# Patient Record
Sex: MALE | Race: WHITE | Employment: FULL TIME | URBAN - METROPOLITAN AREA
[De-identification: names, ages, dates, MRNs, and addresses within clinical notes are randomized per-mention and may not be internally consistent; named-entity substitution may affect disease eponyms.]

---

## 2018-02-20 ENCOUNTER — OFFICE VISIT (OUTPATIENT)
Dept: FAMILY MEDICINE CLINIC | Facility: CLINIC | Age: 33
End: 2018-02-20

## 2018-02-20 VITALS
RESPIRATION RATE: 18 BRPM | WEIGHT: 188 LBS | TEMPERATURE: 98 F | HEIGHT: 71 IN | HEART RATE: 76 BPM | BODY MASS INDEX: 26.32 KG/M2 | DIASTOLIC BLOOD PRESSURE: 74 MMHG | SYSTOLIC BLOOD PRESSURE: 112 MMHG

## 2018-02-20 DIAGNOSIS — M70.22 OLECRANON BURSITIS OF LEFT ELBOW: ICD-10-CM

## 2018-02-20 PROCEDURE — 99212 OFFICE O/P EST SF 10 MIN: CPT | Performed by: FAMILY MEDICINE

## 2018-02-20 PROCEDURE — 99202 OFFICE O/P NEW SF 15 MIN: CPT | Performed by: FAMILY MEDICINE

## 2018-02-20 NOTE — PROGRESS NOTES
HPI:    Patient ID: Peter Lebron is a 29year old male. Pt presents with swelling of the left elbow after banging his elbow on something at work about 1 month ago. Pt noticed sig swelling after about a week. Pt has had no fevers. No sig bruising.  Pt

## 2025-07-24 ENCOUNTER — HOSPITAL ENCOUNTER (OUTPATIENT)
Age: 40
Discharge: HOME | End: 2025-07-24

## 2025-07-24 ENCOUNTER — APPOINTMENT (OUTPATIENT)
Dept: CT IMAGING | Age: 40
End: 2025-07-24
Attending: NURSE PRACTITIONER

## 2025-07-24 VITALS
OXYGEN SATURATION: 97 % | DIASTOLIC BLOOD PRESSURE: 62 MMHG | TEMPERATURE: 98 F | HEART RATE: 57 BPM | SYSTOLIC BLOOD PRESSURE: 114 MMHG | RESPIRATION RATE: 20 BRPM

## 2025-07-24 DIAGNOSIS — T14.8XXA MUSCLE STRAIN: Primary | ICD-10-CM

## 2025-07-24 LAB
BILIRUB UR QL STRIP: NEGATIVE
CLARITY UR: CLEAR
COLOR UR: YELLOW
GLUCOSE UR STRIP-MCNC: NEGATIVE MG/DL
HGB UR QL STRIP: NEGATIVE
KETONES UR STRIP-MCNC: NEGATIVE MG/DL
LEUKOCYTE ESTERASE UR QL STRIP: NEGATIVE
NITRITE UR QL STRIP: NEGATIVE
PH UR STRIP: 5.5
PROT UR STRIP-MCNC: NEGATIVE MG/DL
SP GR UR STRIP: >=1.03
UROBILINOGEN UR STRIP-ACNC: <2 MG/DL

## 2025-07-24 PROCEDURE — 99204 OFFICE O/P NEW MOD 45 MIN: CPT

## 2025-07-24 PROCEDURE — 81002 URINALYSIS NONAUTO W/O SCOPE: CPT

## 2025-07-24 PROCEDURE — 74176 CT ABD & PELVIS W/O CONTRAST: CPT | Performed by: NURSE PRACTITIONER

## 2025-07-24 RX ORDER — CYCLOBENZAPRINE HCL 10 MG
10 TABLET ORAL 3 TIMES DAILY PRN
Qty: 10 TABLET | Refills: 0 | Status: SHIPPED | OUTPATIENT
Start: 2025-07-24 | End: 2025-07-31

## 2025-07-24 NOTE — DISCHARGE INSTRUCTIONS
Rest  Ice today  Heat tomorrow  Ibuprofen every 6-8 hours as needed for pain follow with your doctor do not drink or drive while taking muscle relaxer

## 2025-07-24 NOTE — ED INITIAL ASSESSMENT (HPI)
Rlq pain since this am, worse when walking , no n/v/d, no flank or back pain, no urinary symptoms, no trauma or injury

## 2025-07-24 NOTE — ED PROVIDER NOTES
Patient Seen in: [facility]        History  Chief Complaint   Patient presents with    Abdominal Pain     Stated Complaint: Right Side Pain    Subjective:   HPI     Rojas Arreola is a 39 year old male who presents with pelvic pain when walking.    He began experiencing pelvic pain this morning, initially described as a 'little ache' upon waking. The pain is localized to the right hip flank area and does not radiate to the groin. It is described as feeling like a 'pinched nerve' and occurs specifically when walking.    He played disc golf the previous night, which involves a lot of twisting, and suggests this might have contributed to his symptoms. The pain is sometimes absent, allowing him to walk about a hundred feet without issue, but then returns with a 'lightning' or 'shock' sensation when twisting.    He denies rash, shingles, kidney stones, or blood in the urine. He has a history of back pain, which he attributes to his poor quality bed. No other symptoms such as fever or urinary issues have been noticed.        Objective:     History reviewed. No pertinent past medical history.           Past Surgical History:   Procedure Laterality Date    Vasectomy  11/03/2017    Dr. Little                Social History     Socioeconomic History    Marital status:    Tobacco Use    Smoking status: Never    Smokeless tobacco: Never   Vaping Use    Vaping status: Every Day   Substance and Sexual Activity    Alcohol use: Not Currently    Drug use: Not Currently              Review of Systems    Positive for stated complaint: Right Side Pain  Other systems are as noted in HPI.  Constitutional and vital signs reviewed.      All other systems reviewed and negative except as noted above.                  Physical Exam    ED Triage Vitals [07/24/25 1431]   /62   Pulse 57   Resp 20   Temp 98 °F (36.7 °C)   Temp src Oral   SpO2 97 %   O2 Device None (Room air)       Current Vitals:   Vital Signs  BP:  114/62  Pulse: 57  Resp: 20  Temp: 98 °F (36.7 °C)  Temp src: Oral    Oxygen Therapy  SpO2: 97 %  O2 Device: None (Room air)            Physical Exam  Vitals and nursing note reviewed.   Constitutional:       Appearance: He is well-developed.   Abdominal:      General: Bowel sounds are normal.      Palpations: Abdomen is soft.      Tenderness: There is abdominal tenderness (right lower flank/superior hip). There is no right CVA tenderness or left CVA tenderness.      Hernia: No hernia is present.   Skin:     General: Skin is warm and dry.   Neurological:      Mental Status: He is alert.                 ED Course  Labs Reviewed   [facility] POCT URINALYSIS DIPSTICK                            MDM     Muscle sprain strain nerve inflammation, considered kidney stone appendicitis  CT unremarkable will treat with supportive care Flexeril close PMD follow-up        Medical Decision Making  Problems Addressed:  Muscle strain: acute illness or injury with systemic symptoms that poses a threat to life or bodily functions    Risk  OTC drugs.  Prescription drug management.        Disposition and Plan     Clinical Impression:  1. Muscle strain         Disposition:  Discharge  7/24/2025  4:14 pm    Follow-up:  Zaheer Thrasher MD  [address] [place] [id number]  [phone]    In 1 week  If symptoms worsen follow up sooner          Medications Prescribed:  Discharge Medication List as of 7/24/2025  4:15 PM        START taking these medications    Details   cyclobenzaprine 10 MG Oral Tab Take 1 tablet (10 mg total) by mouth 3 (three) times daily as needed for Muscle spasms., Normal, Disp-10 tablet, R-0                   Supplementary Documentation: